# Patient Record
Sex: MALE | Race: OTHER | HISPANIC OR LATINO | URBAN - METROPOLITAN AREA
[De-identification: names, ages, dates, MRNs, and addresses within clinical notes are randomized per-mention and may not be internally consistent; named-entity substitution may affect disease eponyms.]

---

## 2017-02-13 ENCOUNTER — EMERGENCY (EMERGENCY)
Facility: HOSPITAL | Age: 27
LOS: 1 days | Discharge: HOME | End: 2017-02-13
Attending: EMERGENCY MEDICINE | Admitting: EMERGENCY MEDICINE
Payer: COMMERCIAL

## 2017-02-13 VITALS
RESPIRATION RATE: 16 BRPM | HEART RATE: 68 BPM | SYSTOLIC BLOOD PRESSURE: 138 MMHG | DIASTOLIC BLOOD PRESSURE: 85 MMHG | OXYGEN SATURATION: 97 % | TEMPERATURE: 98 F

## 2017-02-13 DIAGNOSIS — S61.402A UNSPECIFIED OPEN WOUND OF LEFT HAND, INITIAL ENCOUNTER: ICD-10-CM
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
## 2017-02-13 PROCEDURE — 99282 EMERGENCY DEPT VISIT SF MDM: CPT

## 2017-02-13 PROCEDURE — 99283 EMERGENCY DEPT VISIT LOW MDM: CPT

## 2017-02-13 NOTE — ED PROVIDER NOTE - MEDICAL DECISION MAKING DETAILS
skin avulsion to left hand. neurovascular intact. td up to date. surgicell applied. wound care d/w pt. F/u with occupational health

## 2017-02-13 NOTE — ED PROVIDER NOTE - PHYSICAL EXAMINATION
2 cm superficial avulsion to lateral distal 5th metacarpal region. FROM. sensation intact. cap refill < 2secs. strength 5/5.
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
## 2017-02-13 NOTE — ED ADULT NURSE NOTE - OBJECTIVE STATEMENT
Laceration wound to palmar region of left hand. Bleeding controlled. Sensation, motor function intact. Distal pulses palpable and strong.

## 2017-02-15 ENCOUNTER — EMERGENCY (EMERGENCY)
Facility: HOSPITAL | Age: 27
LOS: 1 days | Discharge: HOME | End: 2017-02-15
Attending: EMERGENCY MEDICINE | Admitting: EMERGENCY MEDICINE
Payer: COMMERCIAL

## 2017-02-15 VITALS
DIASTOLIC BLOOD PRESSURE: 76 MMHG | RESPIRATION RATE: 16 BRPM | WEIGHT: 220.02 LBS | OXYGEN SATURATION: 97 % | HEART RATE: 79 BPM | TEMPERATURE: 98 F | SYSTOLIC BLOOD PRESSURE: 129 MMHG | HEIGHT: 66 IN

## 2017-02-15 DIAGNOSIS — S61.412D LACERATION WITHOUT FOREIGN BODY OF LEFT HAND, SUBSEQUENT ENCOUNTER: ICD-10-CM

## 2017-02-15 PROCEDURE — 99212 OFFICE O/P EST SF 10 MIN: CPT
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
## 2017-02-15 NOTE — ED PROVIDER NOTE - MEDICAL DECISION MAKING DETAILS
25 yo male in the Er for a wound check- has normal appearing healing w/o any signs of infection small superficial skin avulsion on the left palm. steri-strips applied, wound care discussed. ready for discharge

## 2017-02-15 NOTE — ED PROVIDER NOTE - SKIN, MLM
Skin normal color for race, warm, dry and intact. No evidence of rash. wound to left palm- base of the 5th  digit, healing well, no drainage, no signs of infection. wound cleaned with NS, steri-strips changed.

## 2017-02-15 NOTE — ED ADULT NURSE NOTE - CHPI ED SYMPTOMS NEG
no pain/no vomiting/no purulent drainage/no chills/wound healing well/no fever/no bleeding/no bleeding at site/no redness/no red streaks/no drainage

## 2020-03-17 ENCOUNTER — EMERGENCY (EMERGENCY)
Facility: HOSPITAL | Age: 30
LOS: 1 days | Discharge: HOME | End: 2020-03-17
Admitting: EMERGENCY MEDICINE
Payer: COMMERCIAL

## 2020-03-17 VITALS
HEIGHT: 66 IN | OXYGEN SATURATION: 99 % | WEIGHT: 186.95 LBS | SYSTOLIC BLOOD PRESSURE: 145 MMHG | RESPIRATION RATE: 20 BRPM | HEART RATE: 63 BPM | DIASTOLIC BLOOD PRESSURE: 88 MMHG | TEMPERATURE: 98 F
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
## 2020-03-17 DIAGNOSIS — S92.351A DISPLACED FRACTURE OF FIFTH METATARSAL BONE, RIGHT FOOT, INITIAL ENCOUNTER FOR CLOSED FRACTURE: ICD-10-CM

## 2020-03-17 DIAGNOSIS — M79.671 PAIN IN RIGHT FOOT: ICD-10-CM

## 2020-03-17 PROCEDURE — 73630 X-RAY EXAM OF FOOT: CPT

## 2020-03-17 PROCEDURE — 28470 CLTX METATARSAL FX WO MNP EA: CPT | Mod: RT

## 2020-03-17 PROCEDURE — 73630 X-RAY EXAM OF FOOT: CPT | Mod: 26,RT

## 2020-03-17 PROCEDURE — 99284 EMERGENCY DEPT VISIT MOD MDM: CPT

## 2020-03-17 PROCEDURE — 99284 EMERGENCY DEPT VISIT MOD MDM: CPT | Mod: 25

## 2020-03-17 RX ORDER — IBUPROFEN 200 MG
600 TABLET ORAL ONCE
Refills: 0 | Status: COMPLETED | OUTPATIENT
Start: 2020-03-17 | End: 2020-03-17

## 2020-03-17 RX ADMIN — Medication 600 MILLIGRAM(S): at 17:31

## 2020-03-17 RX ADMIN — Medication 600 MILLIGRAM(S): at 18:18

## 2020-03-17 NOTE — ED ADULT NURSE NOTE - NSIMPLEMENTINTERV_GEN_ALL_ED
Implemented All Universal Safety Interventions:  [place] to call system. Call bell, personal items and telephone within reach. Instruct patient to call for assistance. Room bathroom lighting operational. Non-slip footwear when patient is off stretcher. Physically safe environment: no spills, clutter or unnecessary equipment. Stretcher in lowest position, wheels locked, appropriate side rails in place.

## 2020-03-17 NOTE — ED PROVIDER NOTE - NSFOLLOWUPINSTRUCTIONS_ED_ALL_ED_FT
Metatarsal Fracture  A metatarsal fracture is a break in one of the five bones that connect the toes to the rest of the foot. This may also be called a forefoot fracture. A metatarsal fracture may be:  A crack in the surface of the bone (stress fracture). This often occurs in athletes.A break all the way through the bone (complete fracture).The bone that connects to the little toe (fifth metatarsal) is most commonly fractured. Ballet dancers often fracture this bone.  What are the causes?  A metatarsal fracture may be caused by:  Sudden twisting of the foot.Falling onto the foot.Something heavy falling onto the foot.Overuse or repetitive exercise.What increases the risk?  This condition is more likely to develop in people who:  Play contact sports.Do ballet.Have a condition that causes the bones to become thin and brittle (osteoporosis).Have a low calcium level.What are the signs or symptoms?  Symptoms of this condition include:  Pain that gets worse when walking or standing.Pain when pressing on the foot or moving the toes.Swelling.Bruising on the top or bottom of the foot.How is this diagnosed?  This condition may be diagnosed based on:  Your symptoms. Any recent foot injuries you have had. A physical exam.An X-ray of your foot. If you have a stress fracture, it may not show up on an X-ray, and you may need other imaging tests, such as:   A bone scan.CT scan. MRI.How is this treated?  Treatment depends on how severe your fracture is and how the pieces of the broken bone line up with each other (alignment). Treatment may involve:  Wearing a cast, splint, or supportive boot on your foot.Using crutches, and not putting any weight on your foot.Having surgery to align broken bones (open reduction and internal fixation, ORIF).Physical therapy.Follow-up visits and X-rays to make sure you are healing.Follow these instructions at home:  If you have a splint or a supportive boot:     Wear the splint or boot as told by your health care provider. Remove it only as told by your health care provider.Loosen the splint or boot if your toes tingle, become numb, or turn cold and blue. Keep the splint or boot clean. If your splint or boot is not waterproof:   Do not let it get wet. Cover it with a watertight covering when you take a bath or a shower.If you have a cast:     Do not stick anything inside the cast to scratch your skin. Doing that increases your risk for infection.Check the skin around the cast every day. Tell your health care provider about any concerns.You may put lotion on dry skin around the edges of the cast. Do not put lotion on the skin underneath the cast.Keep the cast clean.If the cast is not waterproof:   Do not let it get wet.Cover it with a watertight covering when you take a bath or a shower.Activity     Do not use your affected leg to support your body weight until your health care provider says that you can. Use crutches as directed.Ask your health care provider what activities are safe for you during recovery, and ask what activities you need to avoid.Do physical therapy exercises as directed.Driving     Do not drive or use heavy machinery while taking pain medicine.Do not drive while wearing a cast, splint, or boot on a foot that you use for driving.Managing pain, stiffness, and swelling        If directed, put ice on painful areas:  Put ice in a plastic bag.Place a towel between your skin and the bag.  If you have a removable splint or boot, remove it as told by your health care provider.If you have a cast, place a towel between your cast and the bag.Leave the ice on for 20 minutes, 2–3 times a day.Move your toes often to avoid stiffness and to lessen swelling.Raise (elevate) your lower leg above the level of your heart while you are sitting or lying down.General instructions     Do not put pressure on any part of the cast or splint until it is fully hardened. This may take several hours.Take over-the-counter and prescription medicines only as told by your health care provider.Do not use any products that contain nicotine or tobacco, such as cigarettes and e-cigarettes. These can delay bone healing. If you need help quitting, ask your health care provider.Do not take baths, swim, or use a hot tub until your health care provider approves. Ask your health care provider if you may take showers.Keep all follow-up visits as told by your health care provider. This is important.Contact a health care provider if you have:  Pain that gets worse or does not get better with medicine. A fever.A bad smell coming from your cast or splint.Get help right away if you have:  Any of the following in your toes or your foot, even after loosening your splint (if applicable):   Numbness. Tingling. Coldness.Blue skin. Redness or swelling that gets worse.Pain that suddenly becomes severe.Summary  A metatarsal fracture is a break in one of the five bones that connect the toes to the rest of the foot.Treatment depends on how severe your fracture is and how the pieces of the broken bone line up with each other (alignment). This may include wearing a cast, splint, or supportive boot, or using crutches. Sometimes surgery is needed to align the bones.Ice and elevate your foot to help lessen the pain and swelling.Make sure you know what symptoms should cause you to get help right away.This information is not intended to replace advice given to you by your health care provider. Make sure you discuss any questions you have with your health care provider.    Document Released: 09/09/2003 Document Revised: 01/14/2019 Document Reviewed: 01/14/2019  [employer] Patient Education © 2020 [employer]

## 2020-03-17 NOTE — ED ADULT NURSE NOTE - OBJECTIVE STATEMENT
Patient c/o of right foot pain, states placed extra weight on that foot when carrying a heavy load and heard something crack.  AMbulates on own w/ limping gait, able to bear weight.

## 2020-03-17 NOTE — ED PROVIDER NOTE - LOWER EXTREMITY EXAM, RIGHT
SOFT COMPARTMENT, NO SKIN BREAKS, GOOD ROM, SENSATION INTACT WITH 2+ PULSE AND GOOD CAP REFILL/BRUISING/SWELLING/TENDERNESS

## 2020-03-17 NOTE — ED ADULT TRIAGE NOTE - CHIEF COMPLAINT QUOTE
pt c/o right foot pain, pt reports lifting something heavy and twisting his ankle, " felt like something pop" ambulating straight, no deformity present, fell weight bearing.
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
## 2020-03-17 NOTE — ED ADULT NURSE REASSESSMENT NOTE - NS ED NURSE REASSESS COMMENT FT1
Seen by podiatry, boot in place to right foot, cane provided, vital signs stable, discharged to home in stable condition.

## 2020-03-17 NOTE — CONSULT NOTE ADULT - SUBJECTIVE AND OBJECTIVE BOX
Attending: Isi    Patient is a 29y old  Male who presents with a chief complaint of right foot pain    HPI: 29 year old male with no PMH presents to ED from [facility] kitchen with right foot pain. Patient reports he was picking up heavy box and felt a pain in his foot. He initially didnt; think it was that bad, but it eventually pain increased. Denies f/c/n/v    Review of systems negative except per HPI    PAST MEDICAL & SURGICAL HISTORY:  No pertinent past medical history  No significant past surgical history    Home Medications:    Allergies    No Known Allergies    Intolerances      FAMILY HISTORY:    Social History:       LABS              Vital Signs Last 24 Hrs  T(C): 36.9 (17 Mar 2020 16:39), Max: 36.9 (17 Mar 2020 16:39)  T(F): 98.4 (17 Mar 2020 16:39), Max: 98.4 (17 Mar 2020 16:39)  HR: 63 (17 Mar 2020 16:39) (63 - 63)  BP: 145/88 (17 Mar 2020 16:39) (145/88 - 145/88)  BP(mean): --  RR: 20 (17 Mar 2020 16:39) (20 - 20)  SpO2: 99% (17 Mar 2020 16:39) (99% - 99%)    PHYSICAL EXAM  General: NAD, AA0x3    Lower Extremity Focused:  Vasc: DP/PT 2/4; right foot with edema and ecchymosis at lateral mid foot and ankle  Derm: Clean, dry and intact  Neuro: Intact  MSK: TTP overlying right 5th met styloid process; no pain along peroneal tendons;     RADIOLOGY

## 2020-03-17 NOTE — ED PROVIDER NOTE - CARE PROVIDER_API CALL
Keshav Snowden (LIANNEM)  Podiatric Medicine and Surgery  [address], [address]  [place] [address]  Phone: [phone]  Fax: [phone]  Follow Up Time:

## 2020-03-17 NOTE — ED PROVIDER NOTE - PATIENT PORTAL LINK FT
You can access the FollowMyHealth Patient Portal offered by [facility] by registering at the following website: [email or web address]. By joining [facility]’s FollowMyHealth portal, you will also be able to view your health information using other applications (apps) compatible with our system.

## 2020-04-26 ENCOUNTER — MESSAGE (OUTPATIENT)
Age: 30
End: 2020-04-26
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
## 2021-06-12 ENCOUNTER — EMERGENCY (EMERGENCY)
Facility: HOSPITAL | Age: 31
LOS: 1 days | Discharge: HOME | End: 2021-06-12
Admitting: EMERGENCY MEDICINE
Payer: COMMERCIAL

## 2021-06-12 VITALS
DIASTOLIC BLOOD PRESSURE: 96 MMHG | RESPIRATION RATE: 18 BRPM | OXYGEN SATURATION: 99 % | SYSTOLIC BLOOD PRESSURE: 150 MMHG | HEART RATE: 75 BPM | HEIGHT: 66 IN | WEIGHT: 184.97 LBS | TEMPERATURE: 98 F

## 2021-06-12 DIAGNOSIS — R07.81 PLEURODYNIA: ICD-10-CM

## 2021-06-12 DIAGNOSIS — S61.052A OPEN BITE OF LEFT THUMB WITHOUT DAMAGE TO NAIL, INITIAL ENCOUNTER: ICD-10-CM
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
## 2021-06-12 PROCEDURE — 99283 EMERGENCY DEPT VISIT LOW MDM: CPT | Mod: 25

## 2021-06-12 PROCEDURE — 71101 X-RAY EXAM UNILAT RIBS/CHEST: CPT

## 2021-06-12 PROCEDURE — 90715 TDAP VACCINE 7 YRS/> IM: CPT

## 2021-06-12 PROCEDURE — 71101 X-RAY EXAM UNILAT RIBS/CHEST: CPT | Mod: 26,LT

## 2021-06-12 PROCEDURE — 99284 EMERGENCY DEPT VISIT MOD MDM: CPT | Mod: 25

## 2021-06-12 PROCEDURE — 96372 THER/PROPH/DIAG INJ SC/IM: CPT | Mod: XU

## 2021-06-12 RX ORDER — CEPHALEXIN 500 MG
500 CAPSULE ORAL ONCE
Refills: 0 | Status: DISCONTINUED | OUTPATIENT
Start: 2021-06-12 | End: 2021-06-12

## 2021-06-12 RX ORDER — TETANUS TOXOID, REDUCED DIPHTHERIA TOXOID AND ACELLULAR PERTUSSIS VACCINE, ADSORBED 5; 2.5; 8; 8; 2.5 [IU]/.5ML; [IU]/.5ML; UG/.5ML; UG/.5ML; UG/.5ML
0.5 SUSPENSION INTRAMUSCULAR ONCE
Refills: 0 | Status: COMPLETED | OUTPATIENT
Start: 2021-06-12 | End: 2021-06-12

## 2021-06-12 RX ADMIN — TETANUS TOXOID, REDUCED DIPHTHERIA TOXOID AND ACELLULAR PERTUSSIS VACCINE, ADSORBED 0.5 MILLILITER(S): 5; 2.5; 8; 8; 2.5 SUSPENSION INTRAMUSCULAR at 16:37

## 2021-06-12 RX ADMIN — Medication 1 TABLET(S): at 16:37

## 2021-06-12 NOTE — ED PROVIDER NOTE - MUSCULOSKELETAL, MLM
+ ttp along the axillary line of the ribs, no ecchymosis, no crepitus, no deformity, no flail chest, no ttp to posterior ribs, FROM of the L wrist and L hand including digits, 2cm healing laceration to the L thumb between the DIP and MCP, full flexion and extension of the thumb, ttp to the surrounding skin, no purulent discharge, no swelling, no cellulitis, + minimal numbness along the outer aspect of the thumb, no motor or sensory deficits.
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
## 2021-06-12 NOTE — ED PROVIDER NOTE - PATIENT PORTAL LINK FT
You can access the FollowMyHealth Patient Portal offered by [facility] by registering at the following website: [email or web address]. By joining [facility]’s FollowMyHealth portal, you will also be able to view your health information using other applications (apps) compatible with our system.
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
## 2021-06-12 NOTE — ED PROVIDER NOTE - NSFOLLOWUPINSTRUCTIONS_ED_ALL_ED_FT
ACUTE WOUNDS - General Information           Acute Wounds    WHAT YOU NEED TO KNOW:    What is an acute wound? A wound is an injury that causes a break in the skin. An acute wound can happen suddenly, last a short time, and may heal on its own.    What causes an acute wound?   •An abrasion is a scrape caused when a rough surface rubs against the skin.      •A laceration is a jagged wound caused by a hard blow to the skin.      •A puncture wound is usually made by a sharp, round, and pointed object, such as a needle or nail.      •A cut is caused by an object with a sharp edge, such as a knife or broken glass. It is called an incision when the cut happens during surgery.      What are the signs and symptoms of an infected wound?   •Milky, yellow, green, or brown pus in the wound      •Red, tender, or warm area around the wound      •Fever      How is an acute wound diagnosed? Your healthcare provider will ask about your injury. He or she will examine the injury and the area around it. He or she will check to see how deep the wound is and look for signs of infection. Your provider may check how well you can move the injured body part. He or she will check to see if you are numb at your injury site or below it. You may have either of the following:  •A wound culture is a test of fluid or tissue used to see if your wound is infected. The wound culture will also tell your healthcare providers the cause of your infection.      •An x-ray is a picture of your bones and tissues in the wound area. Healthcare providers use the pictures to look for broken bones, injuries, or foreign objects such as glass or metal.      How is an acute wound treated? Treatment will depend on how severe the wound is and where it is located. It may also depend on the length of time you have had the injury. You may need any of the following:  •NSAIDs, such as ibuprofen, help decrease swelling, pain, and fever. This medicine is available with or without a doctor's order. NSAIDs can cause stomach bleeding or kidney problems in certain people. If you take blood thinner medicine, always ask if NSAIDs are safe for you. Always read the medicine label and follow directions. Do not give these medicines to children under 6 months of age without direction from your child's healthcare provider.      •Acetaminophen decreases pain and fever. It is available without a doctor's order. Ask how much to take and how often to take it. Follow directions. Read the labels of all other medicines you are using to see if they also contain acetaminophen, or ask your doctor or pharmacist. Acetaminophen can cause liver damage if not taken correctly. Do not use more than 4 grams (4,000 milligrams) total of acetaminophen in one day.       •Antibiotics may be given to prevent or treat an infection caused by bacteria.      •Td vaccine is a booster shot used to help prevent tetanus and diphtheria. The Td booster may be given to adolescents and adults every 10 years or for certain wounds and injuries.      •Wound care may include any of the following: ?Cleaning and debridement is done to clean and remove objects, dirt, or dead tissues from the open wound. Your healthcare provider may use soap and water or a different solution to clean your wound. He or she may use a syringe to push the solution into all areas of your wound. The force from the syringe will help push out dirt.      ?Closure of the wound is done with stitches, staples, skin adhesive, or other treatments. This may be done if the wound is wide or deep. Some wounds may need to be packed with wet gauze for some time before closure. Some wounds may not need closure at all to heal.        Call your local emergency number (911 in the [place]) if:   •You have sudden trouble breathing or chest pain.          When should I seek immediate care?   •You have pus or a foul odor coming from the wound.      •Blood soaks through your bandage.      When should I call my doctor?   •You have muscle, joint, or body aches, sweating, or a fever.      •You have increased swelling, redness, or bleeding in your wound.      •Your skin is itchy, swollen, or you have a rash.      •You have questions or concerns about your condition or care.      CARE AGREEMENT:    You have the right to help plan your care. Learn about your health condition and how it may be treated. Discuss treatment options with your healthcare providers to decide what care you want to receive. You always have the right to refuse treatment.        © Copyright [employer] 2021           back to top                          © Copyright [employer] 2021
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
## 2025-02-07 ENCOUNTER — EMERGENCY (EMERGENCY)
Facility: HOSPITAL | Age: 35
LOS: 1 days | Discharge: HOME | End: 2025-02-07
Admitting: EMERGENCY MEDICINE
Payer: COMMERCIAL

## 2025-02-07 VITALS
WEIGHT: 195.11 LBS | RESPIRATION RATE: 18 BRPM | HEART RATE: 67 BPM | HEIGHT: 66 IN | DIASTOLIC BLOOD PRESSURE: 80 MMHG | OXYGEN SATURATION: 99 % | TEMPERATURE: 98 F | SYSTOLIC BLOOD PRESSURE: 145 MMHG

## 2025-02-07 DIAGNOSIS — T22.211A BURN OF SECOND DEGREE OF RIGHT FOREARM, INITIAL ENCOUNTER: ICD-10-CM

## 2025-02-07 DIAGNOSIS — T31.0 BURNS INVOLVING LESS THAN 10% OF BODY SURFACE: ICD-10-CM
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
## 2025-02-07 PROCEDURE — 99285 EMERGENCY DEPT VISIT HI MDM: CPT

## 2025-02-07 PROCEDURE — 99284 EMERGENCY DEPT VISIT MOD MDM: CPT

## 2025-02-07 RX ORDER — IBUPROFEN 600 MG/1
400 TABLET, FILM COATED ORAL ONCE
Refills: 0 | Status: COMPLETED | OUTPATIENT
Start: 2025-02-07 | End: 2025-02-07

## 2025-02-07 RX ORDER — ACETAMINOPHEN 160 MG/5ML
1000 SUSPENSION ORAL ONCE
Refills: 0 | Status: COMPLETED | OUTPATIENT
Start: 2025-02-07 | End: 2025-02-07

## 2025-02-07 RX ADMIN — IBUPROFEN 400 MILLIGRAM(S): 600 TABLET, FILM COATED ORAL at 09:58

## 2025-02-07 RX ADMIN — ACETAMINOPHEN 1000 MILLIGRAM(S): 160 SUSPENSION ORAL at 09:58
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
## 2025-02-07 NOTE — ED PROVIDER NOTE - NSFOLLOWUPINSTRUCTIONS_ED_ALL_ED_FT
You have a first degree burn of the arm.     Practice range of motion exercises to keep the skin from getting tight.  If the small blister ruptures, you should apply bacitracin ointment to it 2-3 times a day.      For pain, you can use Advil or Tylenol, or both.  Take as directed, do not exceed the dosages recommended.    Follow up with primary care provider after the weekend.  Return to the Emergency Department if you have any new or worsening symptoms, or if you have any concerns.  =====================  Burn Care, Adult  A hand with a reddened and blistered area from a burn.  A burn is an injury to the skin or the tissues under the skin. It may be caused by a fire, hot liquid or steam, chemicals, electricity, or the sun. There are three types of burns:  First degree. These burns are similar to a sunburn. They may cause your skin to be red, slightly swollen, and tender. They may be treated at home.  Second degree. These burns are very painful. They may cause your skin to turn very red, swell, leak fluid, look shiny, and blister. In many cases, these burns may be treated at home. If they cover your hands, feet, face, or genitals, get help from a health care provider.  Third degree. These burns are the most severe. They may not be painful, but you may feel pain around the edges of them. Your skin may turn white or black and may look charred, dry, and leathery. These burns cause lasting damage. If you get a third-degree burn, get help right away.  Treatment will depend on the type of burn you have. Taking care of your burn can help to prevent pain and infection. It can also help the burn heal more quickly.    How to care for a first-degree burn  Right after the burn:    Rinse or soak the burn under cool water for 5 minutes or more.  Put a cool, wet cloth (cool compress) on your skin. This may help with pain.  Do not put ice on your burn. This can cause more damage.  Caring for the burn    Clean and care for the burn as told by your provider. You may be told to:  Use mild soap and water to clean the area.  Use a clean cloth to pat the burned area dry after cleaning it. Do not rub or scrub the burn.  Put lotion or aloe vera gel on your skin.  How to care for a second-degree burn  Right after the burn:    Rinse or soak the burn under cool water. Do this for 5–10 minutes.  Do not put ice on your burn. This can cause more damage.  Take off any jewelry or clothing near the burn.  Lightly cover the burn with a clean cloth.  Caring for the burn    Clean and care for the burn as told by your provider. You may be told to:  Clean or rinse out the burned area.  Put a cream or ointment on the burn. You may need to use an antibiotic cream that has silver in it. This can kill bacteria.  Place a germ-free (sterile) dressing over the burn. A dressing is a bandage that is put over a burn to help it heal.  Raise (elevate) the injured area above the level of your heart while you are sitting or lying down.  How to care for a third-degree burn  Right after the burn:    Lightly cover the burn with a clean, dry cloth.  Get help right away. You may need to:  Stay in the hospital.  Have surgery to remove burned tissue or get a skin graft.  Get fluids through an IV.  Caring for the burn    Clean and care for the burn as told by your provider. You may be told to:  Clean or rinse out the burn.  Put a cream or ointment on the burn.  Put a sterile dressing in the burned area (packing).  Put a sterile dressing over the burn.  Use pressure (compression) dressings.  Elevate the injured area above the level of your heart while you are sitting or lying down.  Wear splints or immobilizers as told by your provider.  Do exercises as told by your provider.  Rest as told by your provider. Do not do sports or other physical activities until your provider says that you can.  How to prevent infection when caring for a burn  Washing hands with soap and water.  Take these steps to prevent infection and more damage to the tissue. Make sure you:  Wash your hands with soap and water for at least 20 seconds before and after you care for your burn. If soap and water are not available, use hand .  Wear clean gloves as told by your provider.  Do not put butter, oil, toothpaste, or other home remedies on the burn.  Do not scratch or pick at the burn.  Do not break any blisters.  Do not peel the skin.  Do not rub your burn, even when cleaning it.  Check your burn every day for signs of infection. Check for:  More redness, swelling, or pain.  Warmth.  Pus or a bad smell.  Red streaks around the burn.  Follow these instructions at home  Medicines    Take over-the-counter and prescription medicines only as told by your provider.  If you were prescribed antibiotics, take or apply them as told by your provider. Do not stop using the antibiotic even if you start to feel better.  General instructions    Do not use any products that contain nicotine or tobacco. These products include cigarettes, chewing tobacco, and vaping devices, such as e-cigarettes. If you need help quitting, ask your provider.  Drink enough fluid to keep your pee (urine) pale yellow.  Protect your burn from the sun.  Contact a health care provider if:  Your burn does not get better, or it gets worse.  You have any signs of infection.  Your burn starts to look different or gets black or red spots.  Your pain does not get better with medicine.  You have anxiety or depression after the injury.  Get help right away if:  You have red streaks near the burn.  You are in severe pain.  This information is not intended to replace advice given to you by your health care provider. Make sure you discuss any questions you have with your health care provider.

## 2025-02-07 NOTE — ED PROVIDER NOTE - PHYSICAL EXAMINATION
CONSTITUTIONAL: NAD   SKIN: Normal color and turgor.  Erythematous splash burn right AC/forearm, ~1% BSA, noncircumferential.  The burn is nearly entirely first degree with a solitary intact blister apx 0.5 cm in diameter.    HEAD: NC/AT.  EYES: Conjunctiva clear. Anicteric sclera.  ENT: Airway clear. Normal voice. MMM.  RESPIRATORY:  Normal respiratory rate and effort.  CARDIOVASCULAR:  RRR   GI:  Abdomen soft, nontender.    MSK: Compartments soft. Elbow ROM normal.  Strong radial pulse.  NEURO: Alert, clear mental status.  Speech clear. No focal deficits. Gait steady.

## 2025-02-07 NOTE — ED PROVIDER NOTE - CLINICAL SUMMARY MEDICAL DECISION MAKING FREE TEXT BOX
Superficial partial-thickness (first-degree) burn of right arm, volar surface involving AC fossa.  Burned with hot water from  at work yesterday. There is a very small (0.5 cm) blister within the burn, not ruptured.  Pain is mild to moderate.  No other medical conditions.  Patient counseled on burn care, range of motion exercises, bacitracin to blister if it ruptures.

## 2025-02-07 NOTE — ED PROVIDER NOTE - OBJECTIVE STATEMENT
Superficial partial-thickness (first-degree) burn of right arm, volar surface involving AC fossa.  Burned with hot water from  at work yesterday.  Pain is mild to moderate.   Reports redness, no blistering.  No facial/chest/airway burns.  No fever/chills. No other complaints. No other medical conditions.

## 2025-02-07 NOTE — ED PROVIDER NOTE - PATIENT PORTAL LINK FT
You can access the FollowMyHealth Patient Portal offered by [facility] by registering at the following website: [email or web address]. By joining [facility]’s FollowMyHealth portal, you will also be able to view your health information using other applications (apps) compatible with our system.